# Patient Record
Sex: FEMALE
[De-identification: names, ages, dates, MRNs, and addresses within clinical notes are randomized per-mention and may not be internally consistent; named-entity substitution may affect disease eponyms.]

---

## 2019-10-22 ENCOUNTER — HOSPITAL ENCOUNTER (INPATIENT)
Dept: HOSPITAL 90 - 2CH | Age: 64
LOS: 1 days | DRG: 208 | End: 2019-10-23
Attending: THORACIC SURGERY (CARDIOTHORACIC VASCULAR SURGERY) | Admitting: THORACIC SURGERY (CARDIOTHORACIC VASCULAR SURGERY)
Payer: MEDICARE

## 2019-10-22 VITALS — SYSTOLIC BLOOD PRESSURE: 106 MMHG | DIASTOLIC BLOOD PRESSURE: 52 MMHG

## 2019-10-22 VITALS — SYSTOLIC BLOOD PRESSURE: 138 MMHG | DIASTOLIC BLOOD PRESSURE: 56 MMHG

## 2019-10-22 VITALS — DIASTOLIC BLOOD PRESSURE: 41 MMHG | SYSTOLIC BLOOD PRESSURE: 73 MMHG

## 2019-10-22 VITALS — DIASTOLIC BLOOD PRESSURE: 68 MMHG | SYSTOLIC BLOOD PRESSURE: 117 MMHG

## 2019-10-22 VITALS — DIASTOLIC BLOOD PRESSURE: 54 MMHG | SYSTOLIC BLOOD PRESSURE: 132 MMHG

## 2019-10-22 VITALS — SYSTOLIC BLOOD PRESSURE: 102 MMHG | DIASTOLIC BLOOD PRESSURE: 42 MMHG

## 2019-10-22 VITALS — SYSTOLIC BLOOD PRESSURE: 102 MMHG | DIASTOLIC BLOOD PRESSURE: 51 MMHG

## 2019-10-22 VITALS — SYSTOLIC BLOOD PRESSURE: 132 MMHG | DIASTOLIC BLOOD PRESSURE: 45 MMHG

## 2019-10-22 VITALS — DIASTOLIC BLOOD PRESSURE: 58 MMHG | SYSTOLIC BLOOD PRESSURE: 131 MMHG

## 2019-10-22 VITALS — SYSTOLIC BLOOD PRESSURE: 125 MMHG | DIASTOLIC BLOOD PRESSURE: 56 MMHG

## 2019-10-22 VITALS — SYSTOLIC BLOOD PRESSURE: 126 MMHG | DIASTOLIC BLOOD PRESSURE: 45 MMHG

## 2019-10-22 VITALS — WEIGHT: 198 LBS | HEIGHT: 62 IN | BODY MASS INDEX: 36.44 KG/M2

## 2019-10-22 VITALS — SYSTOLIC BLOOD PRESSURE: 118 MMHG | DIASTOLIC BLOOD PRESSURE: 52 MMHG

## 2019-10-22 VITALS — DIASTOLIC BLOOD PRESSURE: 61 MMHG | SYSTOLIC BLOOD PRESSURE: 169 MMHG

## 2019-10-22 VITALS — DIASTOLIC BLOOD PRESSURE: 47 MMHG | SYSTOLIC BLOOD PRESSURE: 88 MMHG

## 2019-10-22 VITALS — SYSTOLIC BLOOD PRESSURE: 127 MMHG | DIASTOLIC BLOOD PRESSURE: 48 MMHG

## 2019-10-22 VITALS — SYSTOLIC BLOOD PRESSURE: 136 MMHG | DIASTOLIC BLOOD PRESSURE: 56 MMHG

## 2019-10-22 VITALS — SYSTOLIC BLOOD PRESSURE: 104 MMHG | DIASTOLIC BLOOD PRESSURE: 61 MMHG

## 2019-10-22 VITALS — SYSTOLIC BLOOD PRESSURE: 122 MMHG | DIASTOLIC BLOOD PRESSURE: 65 MMHG

## 2019-10-22 VITALS — SYSTOLIC BLOOD PRESSURE: 105 MMHG | DIASTOLIC BLOOD PRESSURE: 62 MMHG

## 2019-10-22 VITALS — SYSTOLIC BLOOD PRESSURE: 90 MMHG | DIASTOLIC BLOOD PRESSURE: 57 MMHG

## 2019-10-22 VITALS — SYSTOLIC BLOOD PRESSURE: 126 MMHG | DIASTOLIC BLOOD PRESSURE: 54 MMHG

## 2019-10-22 VITALS — DIASTOLIC BLOOD PRESSURE: 61 MMHG | SYSTOLIC BLOOD PRESSURE: 130 MMHG

## 2019-10-22 VITALS — SYSTOLIC BLOOD PRESSURE: 99 MMHG | DIASTOLIC BLOOD PRESSURE: 52 MMHG

## 2019-10-22 VITALS — DIASTOLIC BLOOD PRESSURE: 63 MMHG | SYSTOLIC BLOOD PRESSURE: 126 MMHG

## 2019-10-22 VITALS — SYSTOLIC BLOOD PRESSURE: 121 MMHG | DIASTOLIC BLOOD PRESSURE: 59 MMHG

## 2019-10-22 VITALS — DIASTOLIC BLOOD PRESSURE: 57 MMHG | SYSTOLIC BLOOD PRESSURE: 109 MMHG

## 2019-10-22 VITALS — SYSTOLIC BLOOD PRESSURE: 134 MMHG | DIASTOLIC BLOOD PRESSURE: 63 MMHG

## 2019-10-22 DIAGNOSIS — E11.22: ICD-10-CM

## 2019-10-22 DIAGNOSIS — I12.0: ICD-10-CM

## 2019-10-22 DIAGNOSIS — D62: ICD-10-CM

## 2019-10-22 DIAGNOSIS — Z66: ICD-10-CM

## 2019-10-22 DIAGNOSIS — E66.9: ICD-10-CM

## 2019-10-22 DIAGNOSIS — I46.9: ICD-10-CM

## 2019-10-22 DIAGNOSIS — D68.9: ICD-10-CM

## 2019-10-22 DIAGNOSIS — I48.91: ICD-10-CM

## 2019-10-22 DIAGNOSIS — E78.5: ICD-10-CM

## 2019-10-22 DIAGNOSIS — J96.90: ICD-10-CM

## 2019-10-22 DIAGNOSIS — Z90.710: ICD-10-CM

## 2019-10-22 DIAGNOSIS — Z99.2: ICD-10-CM

## 2019-10-22 DIAGNOSIS — K92.2: ICD-10-CM

## 2019-10-22 DIAGNOSIS — E87.2: ICD-10-CM

## 2019-10-22 DIAGNOSIS — N18.6: ICD-10-CM

## 2019-10-22 DIAGNOSIS — R57.9: ICD-10-CM

## 2019-10-22 DIAGNOSIS — I26.99: Primary | ICD-10-CM

## 2019-10-22 LAB
ALBUMIN SERPL-MCNC: 1.8 G/DL (ref 3.5–5)
APTT PPP: 38.9 SEC (ref 26.3–35.5)
BASE EXCESS BLDA CALC-SCNC: -22.4 MMOL/L (ref -2–3)
BASE EXCESS BLDA CALC-SCNC: -23.6 MMOL/L (ref -2–3)
BASOPHILS NFR BLD AUTO: 0.2 % (ref 0–5)
BILIRUB SERPL-MCNC: 1.3 MG/DL (ref 0.2–1)
BUN SERPL-MCNC: 79 MG/DL (ref 7–18)
BUN SERPL-MCNC: 82 MG/DL (ref 7–18)
CHLORIDE SERPL-SCNC: 100 MMOL/L (ref 101–111)
CHLORIDE SERPL-SCNC: 99 MMOL/L (ref 101–111)
CO2 SERPL-SCNC: 12 MMOL/L (ref 21–32)
CO2 SERPL-SCNC: 20 MMOL/L (ref 21–32)
CREAT SERPL-MCNC: 10.7 MG/DL (ref 0.5–1.5)
CREAT SERPL-MCNC: 11.3 MG/DL (ref 0.5–1.5)
EOSINOPHIL NFR BLD AUTO: 0.1 % (ref 0–8)
ERYTHROCYTE [DISTWIDTH] IN BLOOD BY AUTOMATED COUNT: 15.4 % (ref 11–15.5)
ERYTHROCYTE [DISTWIDTH] IN BLOOD BY AUTOMATED COUNT: 15.4 % (ref 11–15.5)
GFR SERPL CREATININE-BSD FRML MDRD: 4 ML/MIN (ref 60–?)
GFR SERPL CREATININE-BSD FRML MDRD: 4 ML/MIN (ref 60–?)
GLUCOSE SERPL-MCNC: 128 MG/DL (ref 70–105)
GLUCOSE SERPL-MCNC: 67 MG/DL (ref 70–105)
HCO3 BLDA-SCNC: 7.6 MMOL/L (ref 21–28)
HCO3 BLDA-SCNC: 7.7 MMOL/L (ref 21–28)
HCT VFR BLD AUTO: 21.2 % (ref 36–48)
HCT VFR BLD AUTO: 23.1 % (ref 36–48)
INR PPP: 2.71 (ref 0.85–1.15)
LYMPHOCYTES NFR BLD MANUAL: 10 % (ref 22–44)
LYMPHOCYTES NFR SPEC AUTO: 5.9 % (ref 21–51)
MANUAL DIF COMMENT BLD-IMP: (no result)
MCH RBC QN AUTO: 30.2 PG (ref 27–33)
MCH RBC QN AUTO: 30.8 PG (ref 27–33)
MCHC RBC AUTO-ENTMCNC: 30.5 G/DL (ref 32–36)
MCHC RBC AUTO-ENTMCNC: 30.9 G/DL (ref 32–36)
MCV RBC AUTO: 100.9 FL (ref 79–99)
MCV RBC AUTO: 97.7 FL (ref 79–99)
MONOCYTES NFR BLD AUTO: 2.3 % (ref 3–13)
MONOCYTES NFR BLD MANUAL: 1 % (ref 2–9)
NEUTROPHILS NFR BLD AUTO: 91.5 % (ref 40–77)
NEUTS BAND NFR BLD MANUAL: 13 % (ref 0–2)
NEUTS SEG NFR BLD MANUAL: 76 % (ref 40–70)
NRBC BLD MANUAL-RTO: 0.6 % (ref 0–0.19)
NRBC BLD MANUAL-RTO: 1.3 % (ref 0–0.19)
PCO2 BLDA: 34 MMHG (ref 32–45)
PCO2 BLDA: 34 MMHG (ref 32–45)
PLAT MORPH BLD: (no result)
PLATELET # BLD AUTO: 205 K/UL (ref 130–400)
PLATELET # BLD AUTO: 207 K/UL (ref 130–400)
POTASSIUM SERPL-SCNC: 3.9 MMOL/L (ref 3.5–5.1)
POTASSIUM SERPL-SCNC: 5.3 MMOL/L (ref 3.5–5.1)
PROT SERPL-MCNC: 4.9 G/DL (ref 6–8.3)
PROTHROMBIN TIME: 27.4 SEC (ref 9.6–11.6)
RBC # BLD AUTO: 2.17 MIL/UL (ref 4–5.5)
RBC # BLD AUTO: 2.29 MIL/UL (ref 4–5.5)
RBC MORPH BLD: (no result)
RBC MORPH BLD: (no result)
SAO2 % BLDA: 75.4 % (ref 95–99)
SAO2 % BLDA: 95.5 % (ref 95–99)
SODIUM SERPL-SCNC: 153 MMOL/L (ref 136–145)
SODIUM SERPL-SCNC: 155 MMOL/L (ref 136–145)
WBC # BLD AUTO: 25.5 K/UL (ref 4.8–10.8)
WBC # BLD AUTO: 32.8 K/UL (ref 4.8–10.8)

## 2019-10-22 PROCEDURE — 84295 ASSAY OF SERUM SODIUM: CPT

## 2019-10-22 PROCEDURE — 83605 ASSAY OF LACTIC ACID: CPT

## 2019-10-22 PROCEDURE — 80053 COMPREHEN METABOLIC PANEL: CPT

## 2019-10-22 PROCEDURE — 84132 ASSAY OF SERUM POTASSIUM: CPT

## 2019-10-22 PROCEDURE — 94770: CPT

## 2019-10-22 PROCEDURE — 82435 ASSAY OF BLOOD CHLORIDE: CPT

## 2019-10-22 PROCEDURE — 36600 WITHDRAWAL OF ARTERIAL BLOOD: CPT

## 2019-10-22 PROCEDURE — 36415 COLL VENOUS BLD VENIPUNCTURE: CPT

## 2019-10-22 PROCEDURE — 93005 ELECTROCARDIOGRAM TRACING: CPT

## 2019-10-22 PROCEDURE — 80048 BASIC METABOLIC PNL TOTAL CA: CPT

## 2019-10-22 PROCEDURE — 82947 ASSAY GLUCOSE BLOOD QUANT: CPT

## 2019-10-22 PROCEDURE — 86922 COMPATIBILITY TEST ANTIGLOB: CPT

## 2019-10-22 PROCEDURE — 85018 HEMOGLOBIN: CPT

## 2019-10-22 PROCEDURE — 82803 BLOOD GASES ANY COMBINATION: CPT

## 2019-10-22 PROCEDURE — 71045 X-RAY EXAM CHEST 1 VIEW: CPT

## 2019-10-22 PROCEDURE — 94640 AIRWAY INHALATION TREATMENT: CPT

## 2019-10-22 PROCEDURE — 85730 THROMBOPLASTIN TIME PARTIAL: CPT

## 2019-10-22 PROCEDURE — 94003 VENT MGMT INPAT SUBQ DAY: CPT

## 2019-10-22 PROCEDURE — 92950 HEART/LUNG RESUSCITATION CPR: CPT

## 2019-10-22 PROCEDURE — 85060 BLOOD SMEAR INTERPRETATION: CPT

## 2019-10-22 PROCEDURE — 85025 COMPLETE CBC W/AUTO DIFF WBC: CPT

## 2019-10-22 PROCEDURE — 36430 TRANSFUSION BLD/BLD COMPNT: CPT

## 2019-10-22 PROCEDURE — 86900 BLOOD TYPING SEROLOGIC ABO: CPT

## 2019-10-22 PROCEDURE — 86850 RBC ANTIBODY SCREEN: CPT

## 2019-10-22 PROCEDURE — 84100 ASSAY OF PHOSPHORUS: CPT

## 2019-10-22 PROCEDURE — 93308 TTE F-UP OR LMTD: CPT

## 2019-10-22 PROCEDURE — 82948 REAGENT STRIP/BLOOD GLUCOSE: CPT

## 2019-10-22 PROCEDURE — 85027 COMPLETE CBC AUTOMATED: CPT

## 2019-10-22 PROCEDURE — 86927 PLASMA FRESH FROZEN: CPT

## 2019-10-22 PROCEDURE — 86901 BLOOD TYPING SEROLOGIC RH(D): CPT

## 2019-10-22 PROCEDURE — 85610 PROTHROMBIN TIME: CPT

## 2019-10-22 RX ADMIN — SODIUM CHLORIDE PRN MLS/HR: 9 INJECTION, SOLUTION INTRAVENOUS at 19:44

## 2019-10-22 RX ADMIN — SODIUM CHLORIDE SCH UNIT: 9 INJECTION, SOLUTION INTRAVENOUS at 20:00

## 2019-10-22 RX ADMIN — SODIUM CHLORIDE PRN MLS/HR: 9 INJECTION, SOLUTION INTRAVENOUS at 22:39

## 2019-10-22 NOTE — NUR
CODE BENJAMIN Akbar present for code blue. Pt transfer from [place]. Contacted daughter Renee Hayward who was 
arriving to hospital with pt. Explained situation and family soon arrived to floor. Daughter 
and grandson spoke to Dr Tidwell who explained pt was critical. Daughter talking to her 
brothers about making pt DNR. Family in waiting room. Emotional support provided

## 2019-10-22 NOTE — NUR
PATIENT ON EMT STRETCHER IN ROOM. 

PATIENT NOW RECEIVED VIA AMBULANCE ON STRETCHER IN ICU-215. PATIENT CURRENTLY ON LEVOPHED 
DRIP AND EPINEPHRINE DRIP, INTUBATED. INITIAL ASSESSMENT OF PATIENT SHOWS AN ACTIVE GI BLEED 
(NIKKIE RED BLOODY STOOL PRESENT), RIGHT FEMORAL 3 LUMEN CENTRAL LINE AND RIGHT FEMORAL 
A-LINE. PATIENT IS STILL CURRENTLY ON EMT MONITORING SYSTEMS. 

PATIENT BEING TRANSFERRED TO HOSPITAL BED, AND TRANSFERRING VASOACTIVE DRIPS ON OUR IV PUMPS 
UTILIZING [facility] STANDARD DRIP DOSING PROTOCOLS. PATIENT NOTED TO HAVE CODED TWICE AT [facility] PRIOR TO TRANSPORT.

## 2019-10-22 NOTE — NUR
DR LO AT BEDSIDE

DR LO ASSESSING PATIENT WITH ECHOCARDIOGRAM FOR PERICARDIAL EFFUSION. REFER TO DR LO'S 
NOTE

## 2019-10-22 NOTE — NUR
CODE BLUE - PATIENT BRADYCARDIC TO 20-40'S

PATIENT NOW ON OUR MONITOR SYSTEM, AND CONTINUES TO FERMÍN DOWN TO THE 20'S-40'S (IN REPORT 
PATIENT DID SIMILAR WITH PREVIOUS CODE BLUES) PULSE CHECKS PERFORMED BY BOTH CHARGE NURSE 
AND RESPIRATORY THERAPIST NOTED TO BE ABSENT, PEA NOTED. CODE BLUE CALLED AND COMPRESSIONS 
STARTED. BOTH EPINEPHRINE DRIP AND LEVOPHED DRIP ALREADY INFUSING AT MAX RATES PER HOSPITAL 
PROTOCOL. PLEASE REFER TO CODE BLUE RESUSCITATION SHEET

## 2019-10-22 NOTE — NUR
DISCUSSION WITH FAMILY-DNR STATUS

BOTH DR. BENTON AND DR. LO PRESENT DISCUSSING CURRENT PATIENT STATUS WITH FAMILY AT NURSE'S 
STATION. SON, DAUGHTER, GRANDSON, DAUGHTER-IN-LAW INFORMED OF MULTIPLE CARDIOPULMONARY 
ARRESTS, PULMONARY EMBOLI, MAXIMUM VASOPRESSOR SUPPORT, AND PATIENT CONTINUING TO 
INTERMITTENTLY DECOMPENSATE. DR BENTON/DR LO DISCUSSED DNR STATUS WITH FAMILY BUT THEY DID 
NOT WANT TO MAKE A DECISION AT THIS TIME BECAUSE THEY SAID THEY DID NOT WANT TO MAKE 
DECISIONS EXCLUDING OTHER FAMILY MEMBERS. PRESENT FAMILY WAS INFORMED OF VERY POOR PROGNOSIS 
AND HIGH LIKELIHOOD OF ANOTHER CARDIOPULMONARY ARREST. DISCUSSION LASTED APPROXIMATELY 15 
MINUTES AND ALL QUESTIONS WERE ASKED AND ANSWERED. NO DNR AT THIS TIME, FAMILY WANTS TO 
CONTINUE WITH FULL CODE STATUS

## 2019-10-23 VITALS — DIASTOLIC BLOOD PRESSURE: 55 MMHG | SYSTOLIC BLOOD PRESSURE: 91 MMHG

## 2019-10-23 VITALS — SYSTOLIC BLOOD PRESSURE: 90 MMHG | DIASTOLIC BLOOD PRESSURE: 55 MMHG

## 2019-10-23 VITALS — SYSTOLIC BLOOD PRESSURE: 64 MMHG | DIASTOLIC BLOOD PRESSURE: 37 MMHG

## 2019-10-23 VITALS — DIASTOLIC BLOOD PRESSURE: 30 MMHG | SYSTOLIC BLOOD PRESSURE: 50 MMHG

## 2019-10-23 VITALS — SYSTOLIC BLOOD PRESSURE: 90 MMHG | DIASTOLIC BLOOD PRESSURE: 47 MMHG

## 2019-10-23 VITALS — DIASTOLIC BLOOD PRESSURE: 36 MMHG | SYSTOLIC BLOOD PRESSURE: 65 MMHG

## 2019-10-23 VITALS — DIASTOLIC BLOOD PRESSURE: 52 MMHG | SYSTOLIC BLOOD PRESSURE: 95 MMHG

## 2019-10-23 VITALS — SYSTOLIC BLOOD PRESSURE: 27 MMHG | DIASTOLIC BLOOD PRESSURE: 21 MMHG

## 2019-10-23 VITALS — DIASTOLIC BLOOD PRESSURE: 71 MMHG | SYSTOLIC BLOOD PRESSURE: 157 MMHG

## 2019-10-23 LAB
ALBUMIN SERPL-MCNC: 2.1 G/DL (ref 3.5–5)
APTT PPP: 53.5 SEC (ref 26.3–35.5)
AST SERPL-CCNC: 1521 U/L (ref 10–37)
BASOPHILS NFR BLD AUTO: 0.2 % (ref 0–5)
BILIRUB SERPL-MCNC: 1.9 MG/DL (ref 0.2–1)
BUN SERPL-MCNC: 81 MG/DL (ref 7–18)
CHLORIDE SERPL-SCNC: 100 MMOL/L (ref 101–111)
CO2 SERPL-SCNC: 11 MMOL/L (ref 21–32)
CREAT SERPL-MCNC: 11 MG/DL (ref 0.5–1.5)
EOSINOPHIL NFR BLD AUTO: 0.6 % (ref 0–8)
ERYTHROCYTE [DISTWIDTH] IN BLOOD BY AUTOMATED COUNT: 16.7 % (ref 11–15.5)
GFR SERPL CREATININE-BSD FRML MDRD: 4 ML/MIN (ref 60–?)
GLUCOSE SERPL-MCNC: 89 MG/DL (ref 70–105)
HCT VFR BLD AUTO: 27.3 % (ref 36–48)
INR PPP: 4.01 (ref 0.85–1.15)
LYMPHOCYTES NFR SPEC AUTO: 8.6 % (ref 21–51)
MCH RBC QN AUTO: 29.9 PG (ref 27–33)
MCHC RBC AUTO-ENTMCNC: 29.6 G/DL (ref 32–36)
MCV RBC AUTO: 100.8 FL (ref 79–99)
MONOCYTES NFR BLD AUTO: 2 % (ref 3–13)
NEUTROPHILS NFR BLD AUTO: 88.6 % (ref 40–77)
NRBC BLD MANUAL-RTO: 1.2 % (ref 0–0.19)
PHOSPHATE SERPL-MCNC: 13 MG/DL (ref 2.5–4.9)
PLATELET # BLD AUTO: 175 K/UL (ref 130–400)
POTASSIUM SERPL-SCNC: 4.1 MMOL/L (ref 3.5–5.1)
PROT SERPL-MCNC: 5.5 G/DL (ref 6–8.3)
PROTHROMBIN TIME: 39.9 SEC (ref 9.6–11.6)
RBC # BLD AUTO: 2.71 MIL/UL (ref 4–5.5)
SODIUM SERPL-SCNC: 155 MMOL/L (ref 136–145)
WBC # BLD AUTO: 30.8 K/UL (ref 4.8–10.8)

## 2019-10-23 PROCEDURE — 5A12012 PERFORMANCE OF CARDIAC OUTPUT, SINGLE, MANUAL: ICD-10-PCS | Performed by: INTERNAL MEDICINE

## 2019-10-23 PROCEDURE — 0BH17EZ INSERTION OF ENDOTRACHEAL AIRWAY INTO TRACHEA, VIA NATURAL OR ARTIFICIAL OPENING: ICD-10-PCS | Performed by: INTERNAL MEDICINE

## 2019-10-23 PROCEDURE — 30233L1 TRANSFUSION OF NONAUTOLOGOUS FRESH PLASMA INTO PERIPHERAL VEIN, PERCUTANEOUS APPROACH: ICD-10-PCS | Performed by: INTERNAL MEDICINE

## 2019-10-23 PROCEDURE — 5A1935Z RESPIRATORY VENTILATION, LESS THAN 24 CONSECUTIVE HOURS: ICD-10-PCS | Performed by: INTERNAL MEDICINE

## 2019-10-23 PROCEDURE — 30233N1 TRANSFUSION OF NONAUTOLOGOUS RED BLOOD CELLS INTO PERIPHERAL VEIN, PERCUTANEOUS APPROACH: ICD-10-PCS | Performed by: INTERNAL MEDICINE

## 2019-10-23 RX ADMIN — SODIUM CHLORIDE SCH UNIT: 9 INJECTION, SOLUTION INTRAVENOUS at 00:00

## 2019-10-23 RX ADMIN — SODIUM CHLORIDE PRN MLS/HR: 9 INJECTION, SOLUTION INTRAVENOUS at 00:33
